# Patient Record
Sex: MALE | Race: WHITE | NOT HISPANIC OR LATINO | Employment: UNEMPLOYED | URBAN - METROPOLITAN AREA
[De-identification: names, ages, dates, MRNs, and addresses within clinical notes are randomized per-mention and may not be internally consistent; named-entity substitution may affect disease eponyms.]

---

## 2024-11-06 DIAGNOSIS — C22.0 HEPATOCELLULAR CARCINOMA (HCC): ICD-10-CM

## 2024-12-02 ENCOUNTER — HOSPITAL ENCOUNTER (OUTPATIENT)
Dept: RADIOLOGY | Facility: MEDICAL CENTER | Age: 64
End: 2024-12-02
Payer: COMMERCIAL

## 2025-01-09 ENCOUNTER — HOSPITAL ENCOUNTER (OUTPATIENT)
Dept: RADIOLOGY | Facility: MEDICAL CENTER | Age: 65
End: 2025-01-09
Payer: COMMERCIAL

## 2025-01-17 DIAGNOSIS — C22.0 HEPATOCELLULAR CARCINOMA (HCC): ICD-10-CM

## 2025-01-17 NOTE — PROGRESS NOTES
CT abdomen/ pelvis reviewed with Dr. Hough. Requested CT chest not done as requested. There is no definite evidence of local tumor recurrence or metastatic disease on the scans. Last IR intervention 6/3/24. The pt will require further surveillance for his disease and tumor markers remain elevated and increasing. Recommend returning to his medical oncologist for evaluation and surveillance. Pt's IR physician out on medical leave and planned IR clinic appt now greatly delayed.     Last seen at [facility] by Dr. Hendrickson in 2023; referral placed, [facility]  notified of referral and plan. IR will sign off for now but remain available for future liver- directed therapy if needed.

## 2025-06-20 ENCOUNTER — HOSPITAL ENCOUNTER (OUTPATIENT)
Dept: RADIOLOGY | Facility: MEDICAL CENTER | Age: 65
End: 2025-06-20
Payer: COMMERCIAL